# Patient Record
Sex: FEMALE | Race: WHITE
[De-identification: names, ages, dates, MRNs, and addresses within clinical notes are randomized per-mention and may not be internally consistent; named-entity substitution may affect disease eponyms.]

---

## 2017-04-05 ENCOUNTER — HOSPITAL ENCOUNTER (INPATIENT)
Dept: HOSPITAL 74 - JER | Age: 52
LOS: 4 days | Discharge: HOME | DRG: 247 | End: 2017-04-09
Attending: INTERNAL MEDICINE | Admitting: INTERNAL MEDICINE
Payer: COMMERCIAL

## 2017-04-05 VITALS — BODY MASS INDEX: 35.6 KG/M2

## 2017-04-05 DIAGNOSIS — K76.0: ICD-10-CM

## 2017-04-05 DIAGNOSIS — N20.0: ICD-10-CM

## 2017-04-05 DIAGNOSIS — K56.69: Primary | ICD-10-CM

## 2017-04-05 LAB
ALBUMIN SERPL-MCNC: 4.9 G/DL (ref 3.4–5)
ALP SERPL-CCNC: 90 U/L (ref 45–117)
ALT SERPL-CCNC: 59 U/L (ref 12–78)
AMYLASE SERPL-CCNC: 63 U/L (ref 25–115)
ANION GAP SERPL CALC-SCNC: 12 MMOL/L (ref 8–16)
APPEARANCE UR: CLEAR
AST SERPL-CCNC: 35 U/L (ref 15–37)
BASOPHILS # BLD: 0.4 % (ref 0–2)
BILIRUB SERPL-MCNC: 0.6 MG/DL (ref 0.2–1)
BILIRUB UR STRIP.AUTO-MCNC: NEGATIVE MG/DL
CALCIUM SERPL-MCNC: 10 MG/DL (ref 8.5–10.1)
CO2 SERPL-SCNC: 24 MMOL/L (ref 21–32)
COLOR UR: YELLOW
CREAT SERPL-MCNC: 1 MG/DL (ref 0.55–1.02)
DEPRECATED RDW RBC AUTO: 13.2 % (ref 11.6–15.6)
EOSINOPHIL # BLD: 0.4 % (ref 0–4.5)
GLUCOSE SERPL-MCNC: 124 MG/DL (ref 74–106)
HYALINE CASTS URNS QL MICRO: 1 /LPF
KETONES UR QL STRIP: (no result)
LEUKOCYTE ESTERASE UR QL STRIP.AUTO: NEGATIVE
MCH RBC QN AUTO: 29.3 PG (ref 25.7–33.7)
MCHC RBC AUTO-ENTMCNC: 33.7 G/DL (ref 32–36)
MCV RBC: 87.1 FL (ref 80–96)
NEUTROPHILS # BLD: 83.1 % (ref 42.8–82.8)
NITRITE UR QL STRIP: NEGATIVE
PH UR: 9 [PH] (ref 5–8)
PLATELET # BLD AUTO: 235 K/MM3 (ref 134–434)
PMV BLD: 8.5 FL (ref 7.5–11.1)
PROT SERPL-MCNC: 9.5 G/DL (ref 6.4–8.2)
PROT UR QL STRIP: (no result)
PROT UR QL STRIP: NEGATIVE
RBC # BLD AUTO: 8 /HPF (ref 0–3)
RBC # UR STRIP: NEGATIVE /UL
SP GR UR: 1.02 (ref 1–1.03)
UROBILINOGEN UR STRIP-MCNC: NEGATIVE E.U./DL (ref 0.2–1)
WBC # BLD AUTO: 10.1 K/MM3 (ref 4–10)
WBC # UR AUTO: 11 /HPF (ref 3–5)

## 2017-04-05 PROCEDURE — 0D9670Z DRAINAGE OF STOMACH WITH DRAINAGE DEVICE, VIA NATURAL OR ARTIFICIAL OPENING: ICD-10-PCS | Performed by: SURGERY

## 2017-04-05 RX ADMIN — SODIUM CHLORIDE SCH MLS/HR: 9 INJECTION, SOLUTION INTRAVENOUS at 16:15

## 2017-04-05 RX ADMIN — SODIUM CHLORIDE SCH MLS/HR: 9 INJECTION, SOLUTION INTRAVENOUS at 22:06

## 2017-04-05 NOTE — HP
CHIEF COMPLAINT: abd pain, nausea, vomiting





PCP: 





HISTORY OF PRESENT ILLNESS:


53 y/o F w/no PMH presents to ER w/ c/o abd pain and nausea since 3 am last 

night. Pt is Portuguese speaking and history was obtained via translation from 

sister-in-law at bedside. She states the pain started all of a sudden in her 

LLQ and moved to her whole abdomen. The pain was rated as a "12/10" and she 

felt nauseous as well.  Due to the nausea she induced herself to vomit. Vomit 

was nonbloody and nonbilious. She has never felt symptoms like this in the 

past. She has had 3 regular BMs since the onset of pain with no blood in stool. 

Last BM was approximately 1 hour before I had spoken to her and in the ER.  She 

also had 1 episode of emesis in ER that was also non-bloody, non-bilious.  Pt 

has had relief with pain meds and NGT placement in ER. She denies any CP, SOB, 

HA, fevers, chills, dysuria. No change in diet recently. Nephews and nieces are 

sick with gastric illnesses. Pt has had 1  and hysterectomy in the 

past.  She sees her PCP (Dr. Mohamud?) regularly.





ER course was notable for:


(1) benadryl, pepcid, reglan, zofran


(2)


(3)





Recent Travel: denies





PAST MEDICAL HISTORY: No PMH





PAST SURGICAL HISTORY:  24 years ago, hysterectomy 20 years ago





Social History:


Smoking: denies


Alcohol: denies


Drugs: denies





Family History: Brother: DM


Allergies





No Known Allergies Allergy (Verified 17 10:15)


 








HOME MEDICATIONS:


 Home Medications











 Medication  Instructions  Recorded


 


NK [No Known Home Medication]  17








REVIEW OF SYSTEMS


CONSTITUTIONAL: 


Absent:  fever, chills, diaphoresis, generalized weakness, malaise, loss of 

appetite, weight change


HEENT: 


Absent:  rhinorrhea, nasal congestion, throat pain, throat swelling, difficulty 

swallowing, mouth swelling, ear pain, eye pain, visual changes


CARDIOVASCULAR: 


Absent: chest pain, syncope, palpitations, irregular heart rate, lightheadedness

, peripheral edema


RESPIRATORY: 


Absent: cough, shortness of breath, dyspnea with exertion, orthopnea, wheezing, 

stridor, hemoptysis


GASTROINTESTINAL:


abd pain, nausea, vomiting


Absent:=abdominal distension, diarrhea, constipation, melena, hematochezia


GENITOURINARY: 


Absent: dysuria, frequency, urgency, hesitancy, hematuria, flank pain, genital 

pain


MUSCULOSKELETAL: 


Absent: myalgia, arthralgia, joint swelling, back pain, neck pain


SKIN: 


Absent: rash, itching, pallor


HEMATOLOGIC/IMMUNOLOGIC: 


Absent: easy bleeding, easy bruising, lymphadenopathy, frequent infections


ENDOCRINE:


Absent: unexplained weight gain, unexplained weight loss, heat intolerance, 

cold intolerance


NEUROLOGIC: 


Absent: headache, focal weakness or paresthesias, dizziness, unsteady gait, 

seizure, mental status changes, bladder or bowel incontinence


PSYCHIATRIC: 


Absent: anxiety, depression, suicidal or homicidal ideation, hallucinations.








PHYSICAL EXAMINATION


 Vital Signs - 24 hr











  17





  18:10 19:42


 


Temperature 98.8 F 98.1 F


 


Pulse Rate [ 102 H 104 H





Apical]  


 


Respiratory 19 19





Rate  


 


Blood Pressure 129/75 133/87





[Left Arm]  


 


O2 Sat by Pulse 96 97





Oximetry (%)  











GENERAL: Awake, alert, and fully oriented, in no acute distress.


HEAD: Normal with no signs of trauma.


EYES: extraocular movements intact, sclera anicteric, conjunctiva clear. No lid 

lag.


EARS, NOSE, THROAT: Ears normal, nares patent, oropharynx clear without 

exudates. Moist mucous membranes.


NECK: Normal range of motion, supple without lymphadenopathy, JVD, or masses.


LUNGS: Breath sounds equal, clear to auscultation bilaterally. No wheezes, and 

no crackles. No accessory muscle use.


HEART: Regular rate and rhythm, normal S1 and S2 without murmur, rub or gallop.


ABDOMEN: Soft, LLQ tenderness, not distended, hypoactive bowel sounds, no 

guarding, no rebound, no masses.  No hepatomegaly or  splenomegaly. NGT in 

place.


MUSCULOSKELETAL: Normal range of motion at all joints. No bony deformities or 

tenderness. No CVA tenderness.


LOWER EXTREMITIES: 2+ pulses, warm, well-perfused. No calf tenderness. No 

peripheral edema. 


NEUROLOGICAL:  Cranial nerves II-XII intact. Normal speech. Normal gait.


PSYCHIATRIC: Cooperative. Good eye contact. Appropriate mood and affect.


SKIN: Warm, dry, normal turgor, no rashes or lesions noted, normal capillary 

refill. 





 CBCD











WBC  10.1 K/mm3 (4.0-10.0)  H  17  12:30    


 


RBC  5.28 M/mm3 (3.60-5.2)  H  17  12:30    


 


Hgb  15.5 GM/dL (10.7-15.3)  H  17  12:30    


 


Hct  46.0 % (32.4-45.2)  H  17  12:30    


 


MCV  87.1 fl (80-96)   17  12:30    


 


MCHC  33.7 g/dl (32.0-36.0)   17  12:30    


 


RDW  13.2 % (11.6-15.6)   17  12:30    


 


Plt Count  235 K/MM3 (134-434)   17  12:30    


 


MPV  8.5 fl (7.5-11.1)   17  12:30    








 CMP











Sodium  138 mmol/L (136-145)   17  12:30    


 


Potassium  4.2 mmol/L (3.5-5.1)   17  12:30    


 


Chloride  102 mmol/L ()   17  12:30    


 


Carbon Dioxide  24 mmol/L (21-32)   17  12:30    


 


Anion Gap  12  (8-16)   17  12:30    


 


BUN  17 mg/dL (7-18)   17  12:30    


 


Creatinine  1.0 mg/dL (0.55-1.02)   17  12:30    


 


Creat Clearance w eGFR  57.78  (>60)   17  12:30    


 


Random Glucose  124 mg/dL ()  H  17  12:30    


 


Calcium  10.0 mg/dL (8.5-10.1)   17  12:30    


 


Total Bilirubin  0.6 mg/dL (0.2-1.0)   17  12:30    


 


AST  35 U/L (15-37)   17  12:30    


 


ALT  59 U/L (12-78)   17  12:30    


 


Alkaline Phosphatase  90 U/L ()   17  12:30    


 


Total Protein  9.5 g/dl (6.4-8.2)  H  17  12:30    


 


Albumin  4.9 g/dl (3.4-5.0)   17  12:30    








 Laboratory Tests











  17





  12:30


 


Total Amylase  63


 


Lipase  197








 Urine Test Results











Urine Color  Yellow   17  13:20    


 


Urine Appearance  Clear   17  13:20    


 


Urine pH  9.0  (5.0-8.0)  H  17  13:20    


 


Ur Specific Gravity  1.018  (1.001-1.035)   17  13:20    


 


Urine Protein  2+  (NEGATIVE)  H  17  13:20    


 


Urine Glucose (UA)  Negative  (NEGATIVE)   17  13:20    


 


Urine Ketones  1+  (NEGATIVE)  H  17  13:20    


 


Urine Blood  Negative  (NEGATIVE)   17  13:20    


 


Urine Nitrite  Negative  (NEGATIVE)   17  13:20    


 


Urine Bilirubin  Negative  (NEGATIVE)   17  13:20    


 


Ur Leukocyte Esterase  Negative  (NEGATIVE)   17  13:20    


 


Urine RBC  8 /hpf (0-3)   17  13:20    


 


Urine WBC  11 /hpf (3-5)   17  13:20    














Imaging:


Abd U/S: 17 - hepatomegaly with lipomatous infiltration otherwise normal 

RUQ ultrasound


CT abd/pelvis: 17 - mid ot distal small bowel obstruction - multiple fluid 

filled small bowel loops are seen w/in the abdomen bilaterally with a 

transition zone in the region of the upper pelvis centrally. Distal ileal bowel 

loops as well as the colon demonstrated a collapsed appearance. There is mild 

to moderate fluid-filled gastric distention.


B/L nephrolithiasis including a left sided staghorn calculus with resutant mild 

calyceal dilatation.


Prominent diffuse hepatic steatosis.





Active Medications





Acetaminophen (Ofirmev Injection -)  500 mg IVPB Q6H PRN


   PRN Reason: FEVER OR PAIN


   Stop: 17 16:45


Sodium Chloride (Normal Saline -)  1,000 mls @ 125 mls/hr IV ASDIR LISETTE


   Last Admin: 17 22:06 Dose:  125 mls/hr


Pantoprazole Sodium (Protonix 40mg Ivpb (Pre-Docked))  100 mls @ 200 mls/hr 

IVPB DAILY LISETTE


Ondansetron HCl (Zofran Injection)  4 mg IVPB Q6H PRN


   PRN Reason: NAUSEA











ASSESSMENT/PLAN:


53 y/o F w/no PMH presents to ER w/ c/o abd pain and nausea since 3 am last 

night. Found to have SBO and staghorn calculi.





-Abdominal pain secondary to SBO


   -NPO


   -NS @ 125 ml/hr


   -NGT in place w/low wall suction


   -Nausea: zofran 4mg iv q6h prn


   -Pain control: IV ofirmev 500 mg q6h PRN


   -Protonix 40 mg IV qd


   -Surgery consulted (Dr. Galdamez)


      -conservative management at this time


      -f/u Abd XR in AM





-Staghorn calculi


   -As seen on CT abd/pelvis


   -Urine pH 9.0


   -Serum calcium high normal at 10


   -Urology consulted (Dr. Cooper)





-DVT ppx


   -SCDs





-FEN


   -NS @ 125 ml/hr


   -Electrolytes wnl


   -NPO





-Dispo:


   -Admit to M/S





Problem List





- Problem


(1) DVT prophylaxis


Code(s): VCL3374 - 





(2) Nephrolithiasis


Code(s): N20.0 - CALCULUS OF KIDNEY





(3) Small bowel obstruction


Code(s): K56.69 - OTHER INTESTINAL OBSTRUCTION





(4) Staghorn calculus


Code(s): N20.0 - CALCULUS OF KIDNEY








Visit type





- Emergency Visit


Emergency Visit: Yes


ED Registration Date: 17


Care time: The patient presented to the Emergency Department on the above date 

and was hospitalized for further evaluation of their emergent condition.





- New Patient


This patient is new to me today: Yes


Date on this admission: 17





- Critical Care


Critical Care patient: No

## 2017-04-05 NOTE — PN
Progress Note (short form)





- Note


Progress Note: 


Surgery NOTE:





Asked to see the patient for an SBO seen on CT scan today without contrast. She 

has a history of c section and noted to have left midline tenderness since 3 

am. While at her house she had nausea which was relieved by emesis. Today she 

has had two bowel movement, non-bloody. She has no further abd pain but while 

in the ER she vomited alot which was witnessed by the ER staff. No dysuria, 

hematuria. The Er was unable to insert an NGT, so I assisted them.





 Vital Signs











 Period  Temp  Pulse  Resp  BP Sys/Ramos  Pulse Ox


 


 Last 24 Hr  98.0 F-98.8 F    19-20  124-148/75-92  96-99








PE:


GEN: alert, appears comfortable


CV: RRR


Lungs: CTA b/l


Abd: soft, non-distended, mild LLQ and to the left mid abd at umbilical level. 

No masses or rebound





CT scan: fluid filled gastric distention, fluild filled SB loops with tone of 

transition(mid abd)


US: hematomegaly


 CBC, BMP





 04/05/17 12:30 





 04/05/17 12:30 





NGT-16 fr inserted into left Nare and secured at 60cm. Air ausculated over the 

stomach with flushing of ngt, 300 ml out clear/fluid to light green returned. 

bgt placed to LWS





A/p: 51 yo female with SBO on ct scan, ngt inserted


   S/w Dr. Galdamez, full surgical consult to follow


   D/w Medical team, CXR to confirm placement


   admit to the medical service/npo and coservative managment with ngt 

decompression/iv hydration


   Gi ppx


   Axr ordered for the am

## 2017-04-05 NOTE — PN
<Regina Luo - Last Filed: 04/05/17 22:47>





Teaching Attending Note


Name of Resident: Jean Hinkle





Problem List





- Problems


(1) Small bowel obstruction


Assessment/Plan: 


NGT


NPO


IVF NS @125cc/h


Tylenol 500mg IVPB Q6h prn


Protonix 40mg IVPB


Zofran prn


Surgery consult











Code(s): K56.69 - OTHER INTESTINAL OBSTRUCTION





(2) Nephrolithiasis


Assessment/Plan: 


IVF


Urology consult


Code(s): N20.0 - CALCULUS OF KIDNEY





(3) DVT prophylaxis


Assessment/Plan: 


Heparin 5000U sq TID


Code(s): QQV1587 - 








<AndersonOksana - Last Filed: 04/06/17 00:26>





Teaching Attending Note


ATTENDING PHYSICIAN STATEMENT





I saw and evaluated the patient.


I reviewed the resident's note and discussed the case with the resident.


I agree with the resident's findings and plan as documented.








SUBJECTIVE:


51 yo F presents with abdominal pain, nausea and vomiting since 3am today. 

Patient states pain originated in her LLQ and radiates to her whole abdomen.  

Patient rates the pain as 12/10.  She notes that she has had 3 BM since last 

night. Denies sick contacts. Patient denies alleviating or worsening factors. 





PMHx: Denies





OBJECTIVE:


 Last Vital Signs











Temp Pulse Resp BP Pulse Ox


 


 97.7 F   100 H  20   131/85   98 


 


 04/05/17 22:28  04/05/17 22:28  04/05/17 22:28  04/05/17 22:28  04/05/17 22:39








GENERAL: Awake, alert, and fully oriented, in no acute distress


HEENT: NG tube. PERRLA, EOMI. Moist mucosa. No JVD


LUNGS: No distress, speaks full sentences, clear to auscultation bilaterally


HEART: Regular rate and rhythm, normal S1 and S2, no murmurs, rubs or gallops, 

peripheral


pulses normal and equal bilaterally.


ABDOMEN: Soft, LLQ tender on palpation, hypoactive bowel sounds. No guarding, 

no rebound. No


masses. Negative for CVA tenderness.


EXTREMITIES: Normal inspection, Normal range of motion, no edema. No clubbing or


cyanosis.


NEUROLOGICAL: Cranial nerves II through XII grossly intact. Normal speech, 

normal gait, no


focal sensorimotor deficits


SKIN: Warm, Dry, normal turgor, no rashes or lesions noted.


 CBCD











WBC  10.1 K/mm3 (4.0-10.0)  H  04/05/17  12:30    


 


RBC  5.28 M/mm3 (3.60-5.2)  H  04/05/17  12:30    


 


Hgb  15.5 GM/dL (10.7-15.3)  H  04/05/17  12:30    


 


Hct  46.0 % (32.4-45.2)  H  04/05/17  12:30    


 


MCV  87.1 fl (80-96)   04/05/17  12:30    


 


MCHC  33.7 g/dl (32.0-36.0)   04/05/17  12:30    


 


RDW  13.2 % (11.6-15.6)   04/05/17  12:30    


 


Plt Count  235 K/MM3 (134-434)   04/05/17  12:30    


 


MPV  8.5 fl (7.5-11.1)   04/05/17  12:30    








 CMP











Sodium  138 mmol/L (136-145)   04/05/17  12:30    


 


Potassium  4.2 mmol/L (3.5-5.1)   04/05/17  12:30    


 


Chloride  102 mmol/L ()   04/05/17  12:30    


 


Carbon Dioxide  24 mmol/L (21-32)   04/05/17  12:30    


 


Anion Gap  12  (8-16)   04/05/17  12:30    


 


BUN  17 mg/dL (7-18)   04/05/17  12:30    


 


Creatinine  1.0 mg/dL (0.55-1.02)   04/05/17  12:30    


 


Creat Clearance w eGFR  57.78  (>60)   04/05/17  12:30    


 


Calcium  10.0 mg/dL (8.5-10.1)   04/05/17  12:30    


 


Total Bilirubin  0.6 mg/dL (0.2-1.0)   04/05/17  12:30    


 


AST  35 U/L (15-37)   04/05/17  12:30    


 


ALT  59 U/L (12-78)   04/05/17  12:30    


 


Alkaline Phosphatase  90 U/L ()   04/05/17  12:30    


 


Total Protein  9.5 g/dl (6.4-8.2)  H  04/05/17  12:30    


 


Albumin  4.9 g/dl (3.4-5.0)   04/05/17  12:30    








Abdominal CT


Impression: 


A mid to distal small bowel obstruction is noted as discussed. 


Bilateral nephrolithiasis including a left-sided staghorn calculus with 

resultant mild calyceal 


dilatation. 


Prominent diffuse hepatic steatosis.





Documentation prepared by Oksana Barron, acting as medical scribe for Regina Luo M.D.

## 2017-04-05 NOTE — PDOC
History of Present Illness





- General


Chief Complaint: Pain


Stated Complaint: ABD PAIN


Time Seen by Provider: 04/05/17 12:28


History Source: Patient


Exam Limitations: No Limitations





- History of Present Illness


Travel History: No


Initial Comments: 





04/05/17 12:33


52 yr female with abd pain nausea and vomiting started at 3am today. no other 

family members are sick. Pt denies fever, chills no diarrhea. no abd surgery. 

no travel. 


Timing/Duration: reports: constant


Quality: reports: aching


Abdominal Pain Onset Location: reports: generalized abdomen


Pain Radiation: reports: epigastric


Activities at Onset: reports: eating





Past History





- Past Medical History


Allergies/Adverse Reactions: 


 Allergies











Allergy/AdvReac Type Severity Reaction Status Date / Time


 


No Known Allergies Allergy   Verified 04/05/17 10:15











Home Medications: 


Ambulatory Orders





NK [No Known Home Medication]  04/05/17 








Other medical history: DENIES





- Surgical History


Other Surgical History: 





04/05/17 12:34


denies





- Psycho/Social/Smoking Cessation Hx


Anxiety: No


Suicidal Ideation: No


Smoking History: Never smoked


Hx Alcohol Use: No


Drug/Substance Use Hx: No


Substance Use Type: None





Abd/GI Specific PMHX





- Complaint Specific PMHX


Colitis: No


Diverticulitis: No


Gall Bladder Disease: No


GERD: No


Hepatitis: No


Irritable Bowel Synd (IBS): No


Pancreatitis: No


GI Ulcer Disease: No





**Review of Systems





- Review of Systems


Able to Perform ROS?: Yes


Is the patient limited English proficient: No


Constitutional: No: Symptoms Reported


HEENTM: No: Symptoms Reported


Respiratory: No: Symptoms reported


Cardiac (ROS): No: Symptoms Reported


ABD/GI: Yes: Symptoms Reported, See HPI





*Physical Exam





- Vital Signs


 Last Vital Signs











Temp Pulse Resp BP Pulse Ox


 


 98.0 F   80   20   148/78   99 


 


 04/05/17 10:12  04/05/17 10:12  04/05/17 10:12  04/05/17 10:12  04/05/17 10:12














- Physical Exam


General Appearance: Yes: Nourished, Appropriately Dressed


HEENT: positive: EOMI, TITO, Normal ENT Inspection, TMs Normal, Pharynx Normal


Neck: positive: Supple.  negative: Tender


Respiratory/Chest: positive: Lungs Clear, Normal Breath Sounds.  negative: 

Chest Tender


Cardiovascular: positive: Regular Rhythm, Regular Rate


Gastrointestinal/Abdominal: positive: Normal Bowel Sounds, Tender, Increased 

Bowel Sounds.  negative: Guarding, Rebound


Musculoskeletal: positive: Normal Inspection


Extremity: positive: Normal Capillary Refill, Normal Inspection, Normal Range 

of Motion


Integumentary: positive: Normal Color, Dry, Warm


Neurologic: positive: Fully Oriented, Alert, Normal Mood/Affect, Normal Response

, Motor Strength 5/5





ED Treatment Course





- LABORATORY


CBC & Chemistry Diagram: 


 04/05/17 12:30





 04/05/17 12:30





Medical Decision Making





- Medical Decision Making


04/05/17 12:35


cc: abd pain, diarrhea vomiting started at 3am 


pain generalised radiates to epigastric area


no back pain or diarrhea. 





04/05/17 15:11


pt feels better after medications prescribed. 


US pending . 





04/05/17 17:35


ct done pt improved after the medications no vomiting





04/05/17 17:55


paged surgical PA ct results show distal SBO no answer





spoke to Kerrie Hospitalist NP to admit pt. accepted admission to 


will page  surgery





04/05/17 18:06


spoke to Kathi PRICE will see pt at bedside





04/05/17 18:14


Kathi Lama at bedside


family and pt aware of the plan explained via Latvian translation 





*DC/Admit/Observation/Transfer


Diagnosis at time of Disposition: 


 Small bowel obstruction





- Discharge Dispostion


Admit: Yes


Decision to Admit order Date/Time: 





04/05/17 18:05








- Referrals


Referrals: 


Becca Reyes MD [Primary Care Provider] -

## 2017-04-06 LAB
ALBUMIN SERPL-MCNC: 3.5 G/DL (ref 3.4–5)
ALP SERPL-CCNC: 65 U/L (ref 45–117)
ALT SERPL-CCNC: 40 U/L (ref 12–78)
ANION GAP SERPL CALC-SCNC: 7 MMOL/L (ref 8–16)
AST SERPL-CCNC: 23 U/L (ref 15–37)
BASOPHILS # BLD: 0.4 % (ref 0–2)
BILIRUB SERPL-MCNC: 0.6 MG/DL (ref 0.2–1)
CALCIUM SERPL-MCNC: 8.1 MG/DL (ref 8.5–10.1)
CO2 SERPL-SCNC: 26 MMOL/L (ref 21–32)
CREAT SERPL-MCNC: 0.8 MG/DL (ref 0.55–1.02)
DEPRECATED RDW RBC AUTO: 13.7 % (ref 11.6–15.6)
EOSINOPHIL # BLD: 1.7 % (ref 0–4.5)
GLUCOSE SERPL-MCNC: 101 MG/DL (ref 74–106)
MAGNESIUM SERPL-MCNC: 2.5 MG/DL (ref 1.8–2.4)
MCH RBC QN AUTO: 29.6 PG (ref 25.7–33.7)
MCHC RBC AUTO-ENTMCNC: 33.7 G/DL (ref 32–36)
MCV RBC: 87.8 FL (ref 80–96)
NEUTROPHILS # BLD: 64.6 % (ref 42.8–82.8)
PHOSPHATE SERPL-MCNC: 3 MG/DL (ref 2.5–4.9)
PLATELET # BLD AUTO: 196 K/MM3 (ref 134–434)
PMV BLD: 8.7 FL (ref 7.5–11.1)
PROT SERPL-MCNC: 7.1 G/DL (ref 6.4–8.2)
WBC # BLD AUTO: 8.8 K/MM3 (ref 4–10)

## 2017-04-06 RX ADMIN — PANTOPRAZOLE SODIUM SCH MLS/HR: 40 INJECTION, POWDER, FOR SOLUTION INTRAVENOUS at 09:16

## 2017-04-06 RX ADMIN — SODIUM CHLORIDE SCH MLS/HR: 9 INJECTION, SOLUTION INTRAVENOUS at 15:29

## 2017-04-06 RX ADMIN — SODIUM CHLORIDE SCH MLS/HR: 9 INJECTION, SOLUTION INTRAVENOUS at 06:16

## 2017-04-06 RX ADMIN — HEPARIN SODIUM SCH UNIT: 5000 INJECTION, SOLUTION INTRAVENOUS; SUBCUTANEOUS at 21:08

## 2017-04-06 NOTE — CON.GU
Consult





- History of Present Illness


History of Present Illness: 


53 yo female admitted with abdominal pain and small bowel obstruction. Noted to 

have bilateral nonobstructing nephrolithiasis with partial left staghorn 

calculus on CT. Pt is aware of her stones. Denies any flank pain





- Past Medical History


...Pregnant: No





- Alcohol/Substance Use


Hx Alcohol Use: No





- Smoking History


Smoking history: Never smoked


Have you smoked in the past 12 months: No





Home Medications





- Allergies


Allergies/Adverse Reactions: 


 Allergies











Allergy/AdvReac Type Severity Reaction Status Date / Time


 


No Known Allergies Allergy   Verified 04/05/17 10:15














- Home Medications


Home Medications: 


Ambulatory Orders





NK [No Known Home Medication]  04/05/17 











Physical Exam-


Vital Signs: 


 Vital Signs











Temperature  97.9 F   04/06/17 05:52


 


Pulse Rate  93 H  04/06/17 05:52


 


Respiratory Rate  20   04/06/17 05:52


 


Blood Pressure  119/66   04/06/17 05:52


 


O2 Sat by Pulse Oximetry (%)  98   04/05/17 22:39











Labs: 


 CBC, BMP





 04/06/17 06:30 





 04/06/17 06:30 











Imaging





- Results


Cat Scan: Report Reviewed





Problem List





- Problems


(1) Nephrolithiasis


Assessment/Plan: 


No indication for acute intervention for the nephrolithiasis. Recommend outpt 

followup for treatment of her stones


Code(s): N20.0 - CALCULUS OF KIDNEY

## 2017-04-06 NOTE — PN
Teaching Attending Note


Name of Resident: Ty Washington


ATTENDING PHYSICIAN STATEMENT





I saw and evaluated the patient.


I reviewed the resident's note and discussed the case with the resident.


I agree with the resident's findings and plan as documented.








SUBJECTIVE:states she is hungry and has a HA. abdominal pain has resolved. 

denies N/V/C/D, CP, SOB,fever or chills. no flatus/belching or BM








OBJECTIVE:


 Last Vital Signs











Temp Pulse Resp BP Pulse Ox


 


 98.9 F   66   20   133/82   98 


 


 04/06/17 13:43  04/06/17 13:43  04/06/17 13:43  04/06/17 13:43  04/06/17 09:00








General NAD


CV S1 S2 RRR no murmur/rub/gallop


Lungs CTA B/L no wheezing/rales/rhonchi


abdomen soft LLQ tenderness or deep palpation, non distended. no BS





ASSESSMENT AND PLAN:


51yo F with no PMH presented to the Er and was admitted for further evaluation 

of their emergent condition


1. SBO- clinically stable. requesting to eat. persistent SBO on AXR. serial 

abdominal exams. AXR tomorrow. monitor for return of bowel function. maintain 

NGT to suction. IVF, pain and nausea control. surgery on board


2. staghorn calculi- pt was told she had a kidney stone detected by US in 

December 2016 in  but did not follow up. urology evaluated will need to f/u 

as outpatient


3. DVT ppx- start hep sq

## 2017-04-06 NOTE — PN
Physical Exam: 


SUBJECTIVE: Patient seen and examined


Pt is awake, alert and fully oriented 


Pt has no s.s of acute distress 


No abdominal pain, no n/v 


last bowel movement was yesterday formed, bown


pt is passing gas 


Pt is ambulating 








OBJECTIVE:





 Vital Signs











 Period  Temp  Pulse  Resp  BP Sys/Ramos  Pulse Ox


 


 Last 24 Hr  97.7 F-98.9 F    18-20  119-135/66-87  96-98











GENERAL: The patient is awake, alert, and fully oriented, in no acute distress.


HEAD: Normal with no signs of trauma.


NECK: Trachea midline, full range of motion, supple. 


LUNGS: Breath sounds equal, clear to auscultation bilaterally, no wheezes, no 

crackles, no 


accessory muscle use. 


HEART: Regular rate and rhythm, S1, S2 without murmur, rub or gallop.


ABDOMEN: Soft, mild tenderness lower abdomen, nondistended, normoactive bowel 

sounds, no guarding, no 


rebound, no hepatosplenomegaly, no masses.


EXTREMITIES: 2+ pulses, warm, well-perfused, no edema. 


NEUROLOGICAL:. Normal speech, gait not observed.


PSYCH: Normal mood, normal affect.


SKIN: Warm, dry, normal turgor, no rashes or lesions noted














 Laboratory Results - last 24 hr











  04/06/17 04/06/17





  06:30 06:30


 


WBC  8.8 


 


RBC  4.41 


 


Hgb  13.1  D 


 


Hct  38.8  D 


 


MCV  87.8 


 


MCHC  33.7 


 


RDW  13.7 


 


Plt Count  196 


 


MPV  8.7 


 


Neutrophils %  64.6  D 


 


Lymphocytes %  26.4  D 


 


Monocytes %  6.9  D 


 


Eosinophils %  1.7  D 


 


Basophils %  0.4 


 


Sodium   144


 


Potassium   4.6


 


Chloride   111 H


 


Carbon Dioxide   26


 


Anion Gap   7 L


 


BUN   11  D


 


Creatinine   0.8


 


Creat Clearance w eGFR   > 60


 


Random Glucose   101


 


Calcium   8.1 L


 


Phosphorus   3.0


 


Magnesium   2.5 H


 


Total Bilirubin   0.6


 


AST   23  D


 


ALT   40  D


 


Alkaline Phosphatase   65  D


 


Total Protein   7.1  D


 


Albumin   3.5  D








Active Medications











Generic Name Dose Route Start Last Admin





  Trade Name Freq  PRN Reason Stop Dose Admin


 


Acetaminophen  500 mg 04/05/17 22:44  





  Ofirmev Injection -  IVPB 04/06/17 16:45  





  Q6H PRN   





  FEVER OR PAIN   


 


Sodium Chloride  1,000 mls @ 125 mls/hr 04/05/17 16:00 04/06/17 06:16





  Normal Saline -  IV   125 mls/hr





  ASDIR LISETTE   Administration


 


Pantoprazole Sodium  100 mls @ 200 mls/hr 04/06/17 10:00 04/06/17 09:16





  Protonix 40mg Ivpb (Pre-Docked)  IVPB   200 mls/hr





  DAILY LISETTE   Administration


 


Morphine Sulfate  2 mg 04/06/17 12:08 04/06/17 14:04





  Morphine Injection -  IVPUSH   2 mg





  Q4H PRN   Administration





  PAIN   


 


Ondansetron HCl  4 mg 04/05/17 22:41  





  Zofran Injection  IVPB   





  Q6H PRN   





  NAUSEA   








 CBC, BMP





 04/06/17 06:30 





 04/06/17 06:30 





 Laboratory Tests











  04/05/17





  13:20


 


Urine pH  9.0 H


 


Urine Protein  2+ H


 


Urine Ketones  1+ H


 


Urine Nitrite  Negative


 


Ur Leukocyte Esterase  Negative


 


Urine WBC  11


 


Urine HCG, Qual  Negative














ASSESSMENT/PLAN:


2 year old female with n significant pmh presents to the ED with complaint of 

nausea, abdominal pain. Pt was found to have small bowel obstruction and 

staghorn calculi 





SBO


US RUQ showed hepatomegaly with fatty infiltration 


CT abdomen  showed SOb with transition zone in central upper pelvis, b/l 

nephrolithiasis and left staghorn calucli  and hepatosteatosis 


conservative mgt 


NPO 


NGT at LIS


NS at 125ml/h 


Tylenol IV 1gm q6h PRN 


Morphine IV 2mg prn 


protonix 40mg IV qd


Zofran 4mg IV q6h prn 


intake and output 


Daily Abdominal XRay and serial exam.


Surgery consulted Dr Galdamez


 





Nepholithiasis with Staghorn calculi 


UA wbc 11, leuk est negative, Nitrite negative, PH 9, Ca dax 8.5


urology consulted Dr Martínez, no acute intervention, follow up as outpatient 





FEN 


Fluid: NS at 125ml/h


Electrolytes: no abnormalities 


Nutrition: NPO 





Disposition:  keep in medsurg pending resolution of SBO 














Visit type





- Emergency Visit


Emergency Visit: Yes


ED Registration Date: 04/05/17


Care time: The patient presented to the Emergency Department on the above date 

and was hospitalized for further evaluation of their emergent condition.





- New Patient


This patient is new to me today: Yes


Date on this admission: 04/06/17





- Critical Care


Critical Care patient: No





- Discharge Referral


Referred to [facility] No

## 2017-04-06 NOTE — EKG
Test Reason : 

Blood Pressure : ***/*** mmHG

Vent. Rate : 064 BPM     Atrial Rate : 064 BPM

   P-R Int : 166 ms          QRS Dur : 080 ms

    QT Int : 454 ms       P-R-T Axes : 048 028 041 degrees

   QTc Int : 468 ms

 

NORMAL SINUS RHYTHM

POSSIBLE LEFT ATRIAL ENLARGEMENT

CANNOT RULE OUT ANTERIOR INFARCT , AGE UNDETERMINED

ABNORMAL ECG

NO PREVIOUS ECGS AVAILABLE

Confirmed by ELISA DALTON MD ([id number]) on 4/6/2017 4:19:23 PM

 

Referred By:             Confirmed By:ELISA DALTON MD

## 2017-04-06 NOTE — CONSULT
- Consultation


REQUESTING PROVIDER: Emerson KLEIN





CONSULT REQUEST: We have been asked to surgically evaluate this patient for 

evaluation and management of abdominal pain.


PCP:Loreto Norman





HISTORY OF PRESENT ILLNESS:53 y/o  female presented w/ ~ # hours of n/v 

and abdominal pain; she had nausea and ? forced ? emesis; she was passing 

flatus and having bowel movements; w/u was done in the ER and was c/w SBO. She 

states she is passing flatus.





PMHx:    none      





PSHx:     C-S; ALEXIS   


 Home Medications











 Medication  Instructions  Recorded


 


NK [No Known Home Medication]  04/05/17








 Allergies











Allergy/AdvReac Type Severity Reaction Status Date / Time


 


No Known Allergies Allergy   Verified 04/05/17 10:15








PHYSICAL EXAM:


GENERAL: Awake, alert, and fully oriented, in no acute distress.


HEAD: Normal with no signs of trauma.


EYES: , sclera anicteric, 


NECK: Normal ROM, supple without lymphadenopathy, JVD, or masses.


ABDOMEN: Soft, nontender, not distended, no guarding, no rebound, no masses.  

No organomegaly. No hernias; healed lower midline surgical scar.


MUSCULOSKELETAL: Normal ROM at all joints. No bony deformities or tenderness. 

No CVA tenderness.


UPPER EXTREMITIES: 2+ pulses, warm, well-perfused. No cyanosis. Cap refill <2 

seconds. No peripheral edema.


LOWER EXTREMITIES: 2+ pulses, warm, well-perfused. No calf tenderness. No 

peripheral edema. 


NEUROLOGICAL: Normal speech, gait not observed.


PSYCH: Cooperative. Good eye contact. Appropriate mood and affect.


SKIN: Warm, dry, normal turgor, no rashes or lesions noted.


 Vital Signs











Temperature  97.9 F   04/06/17 05:52


 


Pulse Rate  93 H  04/06/17 05:52


 


Respiratory Rate  20   04/06/17 05:52


 


Blood Pressure  119/66   04/06/17 05:52


 


O2 Sat by Pulse Oximetry (%)  98   04/05/17 22:39








 Lab Results











WBC  8.8 K/mm3 (4.0-10.0)   04/06/17  06:30    


 


RBC  4.41 M/mm3 (3.60-5.2)   04/06/17  06:30    


 


Hgb  13.1 GM/dL (10.7-15.3)  D 04/06/17  06:30    


 


Hct  38.8 % (32.4-45.2)  D 04/06/17  06:30    


 


MCV  87.8 fl (80-96)   04/06/17  06:30    


 


MCHC  33.7 g/dl (32.0-36.0)   04/06/17  06:30    


 


RDW  13.7 % (11.6-15.6)   04/06/17  06:30    


 


Plt Count  196 K/MM3 (134-434)   04/06/17  06:30    


 


Sodium  144 mmol/L (136-145)   04/06/17  06:30    


 


Potassium  4.6 mmol/L (3.5-5.1)   04/06/17  06:30    


 


Chloride  111 mmol/L ()  H  04/06/17  06:30    


 


Carbon Dioxide  26 mmol/L (21-32)   04/06/17  06:30    


 


Anion Gap  7  (8-16)  L  04/06/17  06:30    


 


BUN  11 mg/dL (7-18)  D 04/06/17  06:30    


 


Creatinine  0.8 mg/dL (0.55-1.02)   04/06/17  06:30    


 


Random Glucose  101 mg/dL ()   04/06/17  06:30    


 


Calcium  8.1 mg/dL (8.5-10.1)  L  04/06/17  06:30    








Imaging to date reviewed





NGT drained 250 





IMP:SBO





PLAN:Follow up flat/upright axr's are ordered; continue NPO/IVF/NGT.








Farrukh Galdamez MD FACS





Visit type





- Case Type


Case Type: ED Admission





- Emergency


Emergency Visit: Yes


ED Registration Date: 04/05/17


Care time: The patient presented to the Emergency Department on the above date 

and was hospitalized for further evaluation of their emergent condition.





- New patient


This patient is new to me today: Yes


Date on this admission: 04/06/17





- Critical Care


Critical Care patient: No

## 2017-04-07 RX ADMIN — HEPARIN SODIUM SCH UNIT: 5000 INJECTION, SOLUTION INTRAVENOUS; SUBCUTANEOUS at 09:42

## 2017-04-07 RX ADMIN — SODIUM CHLORIDE SCH MLS/HR: 9 INJECTION, SOLUTION INTRAVENOUS at 01:43

## 2017-04-07 RX ADMIN — PANTOPRAZOLE SODIUM SCH MLS/HR: 40 INJECTION, POWDER, FOR SOLUTION INTRAVENOUS at 09:42

## 2017-04-07 RX ADMIN — SODIUM CHLORIDE SCH MLS/HR: 9 INJECTION, SOLUTION INTRAVENOUS at 18:31

## 2017-04-07 RX ADMIN — SODIUM CHLORIDE SCH MLS/HR: 9 INJECTION, SOLUTION INTRAVENOUS at 09:44

## 2017-04-07 RX ADMIN — HEPARIN SODIUM SCH UNIT: 5000 INJECTION, SOLUTION INTRAVENOUS; SUBCUTANEOUS at 21:58

## 2017-04-07 NOTE — PN
Progress Note (short form)





- Note


Progress Note: 


Attending Surgeon





No c/o; passing flatus; no pain





VSS AF





abdo-soft/flat;minimal tympany; no tenderness; o/w negative





NGT-400





AXR's; no a/f levels; air in rectum





IMP: Resolving SBO





PLAN: D/c ngt; keep NPO; f/u films in AM.





Farrukh Galdamez MD FACS

## 2017-04-07 NOTE — PN
Teaching Attending Note


Name of Resident: Ty Washington


ATTENDING PHYSICIAN STATEMENT





I saw and evaluated the patient.


I reviewed the resident's note and discussed the case with the resident.


I agree with the resident's findings and plan as documented.








SUBJECTIVE:clinically improved. pain has resolved. passing flatus. no BM no 

belching. denies CP, SOB,fever, chills, N/V/C/D








OBJECTIVE:


 Last Vital Signs











Temp Pulse Resp BP Pulse Ox


 


 98.1 F   67   20   132/74   98 


 


 04/07/17 10:00 04/07/17 10:00 04/07/17 10:00 04/07/17 10:00 04/06/17 21:00








General NAD


abdomen soft NT/ND. normal BS





ASSESSMENT AND PLAN:


51yo F with no PMH presented to the Er and was admitted for further evaluation 

of their emergent condition


1. SBO- clinically stable. NGT removed. bowel sounds heard. AXR not good study. 

will repeat in AM. will advance diet in AM if continues to clinically improve.  

serial abdominal exams. IVF, pain and nausea control. surgery on board


2. staghorn calculi- urology evaluated will need to f/u as outpatient


3. DVT ppx- hep sq

## 2017-04-08 RX ADMIN — SODIUM CHLORIDE SCH MLS/HR: 9 INJECTION, SOLUTION INTRAVENOUS at 03:46

## 2017-04-08 RX ADMIN — HEPARIN SODIUM SCH UNIT: 5000 INJECTION, SOLUTION INTRAVENOUS; SUBCUTANEOUS at 10:45

## 2017-04-08 RX ADMIN — PANTOPRAZOLE SODIUM SCH MLS/HR: 40 INJECTION, POWDER, FOR SOLUTION INTRAVENOUS at 10:45

## 2017-04-08 RX ADMIN — HEPARIN SODIUM SCH UNIT: 5000 INJECTION, SOLUTION INTRAVENOUS; SUBCUTANEOUS at 21:24

## 2017-04-08 NOTE — PN
Progress Note (short form)





- Note


Progress Note: 


pain resolved. passing copious amounts of flatus. has not eaten/drank today. 

denies CP, SOB,fever, chills, no BM





 Current Medications











Generic Name Dose Route Start Last Admin





  Trade Name Freq  PRN Reason Stop Dose Admin


 


Heparin Sodium (Porcine)  5,000 unit 04/06/17 22:00 04/08/17 10:45





  Heparin -  SQ   5,000 unit





  BID LISETTE   Administration


 


Sodium Chloride  1,000 mls @ 125 mls/hr 04/05/17 16:00 04/08/17 03:46





  Normal Saline -  IV   125 mls/hr





  ASDIR LISETTE   Administration


 


Pantoprazole Sodium  100 mls @ 200 mls/hr 04/06/17 10:00 04/08/17 10:45





  Protonix 40mg Ivpb (Pre-Docked)  IVPB   200 mls/hr





  DAILY LISETTE   Administration


 


Morphine Sulfate  2 mg 04/06/17 12:08 04/06/17 14:04





  Morphine Injection -  IVPUSH   2 mg





  Q4H PRN   Administration





  PAIN   


 


Ondansetron HCl  4 mg 04/05/17 22:41  





  Zofran Injection  IVPB   





  Q6H PRN   





  NAUSEA   








 Last Vital Signs











Temp Pulse Resp BP Pulse Ox


 


 98.1 F   66   17   132/69   99 


 


 04/08/17 13:49  04/08/17 13:49  04/08/17 13:49  04/08/17 04:00  04/07/17 21:00








General NAD


abdomen soft NT/ND. normal BS





ASSESSMENT AND PLAN:


53yo F with no PMH presented to the Er and was admitted for further evaluation 

of their emergent condition


1. SBO- clinically stable. AXR shows persistent SBO but clinically improved 

with flatus. trial of liquids by mouth. will slowly advance. serial abdominal 

exams. d/c IVF if tolerating diet. pain and nausea control. surgery on board


2. staghorn calculi- urology evaluated will need to f/u as outpatient


3. DVT ppx- hep sq





Visit type





- Emergency Visit


Emergency Visit: Yes


ED Registration Date: 04/05/17


Care time: The patient presented to the Emergency Department on the above date 

and was hospitalized for further evaluation of their emergent condition.





- New Patient


This patient is new to me today: No





- Critical Care


Critical Care patient: No





- Discharge Referral


Referred to [facility] No

## 2017-04-08 NOTE — PN
Progress Note (short form)





- Note


Progress Note: 


Attending Surgeon





No c/o; passing flatus; wants to drink/eat





VSS AF





abdomen-soft/flat/nontender; slight tympany





AXR-partial sbo;air in colon





IMP:resolving partial sbo





PLAN: Trial of clear liquids and advance as tolerated.

## 2017-04-09 VITALS — SYSTOLIC BLOOD PRESSURE: 135 MMHG | DIASTOLIC BLOOD PRESSURE: 84 MMHG

## 2017-04-09 VITALS — HEART RATE: 74 BPM | TEMPERATURE: 98.6 F

## 2017-04-09 RX ADMIN — SODIUM CHLORIDE SCH MLS/HR: 9 INJECTION, SOLUTION INTRAVENOUS at 02:00

## 2017-04-09 RX ADMIN — PANTOPRAZOLE SODIUM SCH MLS/HR: 40 INJECTION, POWDER, FOR SOLUTION INTRAVENOUS at 09:33

## 2017-04-09 RX ADMIN — SODIUM CHLORIDE SCH MLS/HR: 9 INJECTION, SOLUTION INTRAVENOUS at 09:32

## 2017-04-09 RX ADMIN — HEPARIN SODIUM SCH UNIT: 5000 INJECTION, SOLUTION INTRAVENOUS; SUBCUTANEOUS at 09:33

## 2017-04-09 NOTE — PN
Progress Note (short form)





- Note


Progress Note: 


pain resolved. BM last night and this morning. tolerated liquid last night and 

advanced to regular diet for lunch and tolerated well.  denies CP, SOB,fever, 

chills, 


 


 Current Medications











Generic Name Dose Route Start Last Admin





  Trade Name Freq  PRN Reason Stop Dose Admin


 


Heparin Sodium (Porcine)  5,000 unit 04/06/17 22:00 04/09/17 09:33





  Heparin -  SQ   5,000 unit





  BID LISETTE   Administration


 


Sodium Chloride  1,000 mls @ 125 mls/hr 04/05/17 16:00 04/09/17 09:32





  Normal Saline -  IV   125 mls/hr





  ASDIR LISETTE   Administration


 


Pantoprazole Sodium  100 mls @ 200 mls/hr 04/06/17 10:00 04/09/17 09:33





  Protonix 40mg Ivpb (Pre-Docked)  IVPB   200 mls/hr





  DAILY LISETTE   Administration


 


Morphine Sulfate  2 mg 04/06/17 12:08 04/06/17 14:04





  Morphine Injection -  IVPUSH   2 mg





  Q4H PRN   Administration





  PAIN   


 


Ondansetron HCl  4 mg 04/05/17 22:41  





  Zofran Injection  IVPB   





  Q6H PRN   





  NAUSEA   











 Last Vital Signs











Temp Pulse Resp BP Pulse Ox


 


 98.6 F   74   20   135/84   99 


 


 04/09/17 14:15  04/09/17 14:15  04/09/17 14:15  04/09/17 08:00  04/09/17 08:00











General NAD


abdomen soft NT/ND. normal BS





ASSESSMENT AND PLAN:


51yo F with no PMH presented to the Er and was admitted for further evaluation 

of their emergent condition


1. SBO- resolved. tolerating diet. 2 BM in the past 24H. diet advance to 

regular. surgery on board


2. staghorn calculi- urology evaluated will need to f/u as outpatient


3. DVT ppx- hep sq


4. d/c home





Visit type





- Emergency Visit


Emergency Visit: Yes


ED Registration Date: 04/05/17


Care time: The patient presented to the Emergency Department on the above date 

and was hospitalized for further evaluation of their emergent condition.





- New Patient


This patient is new to me today: No





- Critical Care


Critical Care patient: No





- Discharge Referral


Referred to [facility] No

## 2017-07-17 ENCOUNTER — HOSPITAL ENCOUNTER (OUTPATIENT)
Dept: HOSPITAL 74 - JASU-SURG | Age: 52
Discharge: HOME | End: 2017-07-17
Attending: UROLOGY
Payer: COMMERCIAL

## 2017-07-17 VITALS — TEMPERATURE: 98.1 F

## 2017-07-17 VITALS — DIASTOLIC BLOOD PRESSURE: 70 MMHG | SYSTOLIC BLOOD PRESSURE: 114 MMHG | HEART RATE: 82 BPM

## 2017-07-17 VITALS — BODY MASS INDEX: 30.5 KG/M2

## 2017-07-17 DIAGNOSIS — N20.0: Primary | ICD-10-CM

## 2017-07-17 PROCEDURE — 0TF4XZZ FRAGMENTATION IN LEFT KIDNEY PELVIS, EXTERNAL APPROACH: ICD-10-PCS | Performed by: UROLOGY

## 2017-07-17 NOTE — OP
Operative Note





- Note:


Operative Date: 07/17/17


Pre-Operative Diagnosis: left renal stones


Operation: left eswl


Findings: 


10 mm and 15 mm mid-pole stone with a 30mm x 10mm upper pole stone all on the 

left


Post-Operative Diagnosis: Same as Pre-op


Surgeon: Romario Edge

## 2017-07-18 NOTE — OP
DATE OF OPERATION:  07/17/2017

 

PREOPERATIVE DIAGNOSIS:  Left nephrolithiasis.

 

POSTOPERATIVE DIAGNOSIS:  Left nephrolithiasis.

 

PROCEDURE:  Left extracorporeal shock wave lithotripsy.

 

ATTENDING:  Ulises Garcia MD

 

ANESTHESIA:  General.

 

DESCRIPTION OF OPERATION:  The patient was brought in the operating room, placed in

supine position on the operating room table.  Ultrasonography and fluoroscopy were

performed.  Two large stones were noted in the mid-pole of the left kidney.  One

stone measured 10 mm, and the other stone measured 15 mm.  An additional upper pole

stone measuring 30 mm x 10 mm was also identified.  Extracorporeal shock wave

lithotripsy was going to be limited to the 2 mid-pole stones.  General anesthesia and

antibiotics were administered, and 1500 impulses at 20 joules of power were

administered to each of the 2 mid-pole stones.  Excellent fragmentation was noted

under real-time fluoroscopy and ultrasonography.  No complications were noted. 

Disposition of the patient was to recovery room.

 

 

ULISES GARCIA M.D. [id number]

DD: 07/17/2017 16:05

DT: 07/18/2017 14:40

Job #:  [id number]

## 2017-09-11 ENCOUNTER — HOSPITAL ENCOUNTER (OUTPATIENT)
Dept: HOSPITAL 74 - JASU-SURG | Age: 52
Discharge: HOME | End: 2017-09-11
Attending: UROLOGY
Payer: COMMERCIAL

## 2017-09-11 VITALS — HEART RATE: 60 BPM

## 2017-09-11 VITALS — TEMPERATURE: 98 F

## 2017-09-11 VITALS — DIASTOLIC BLOOD PRESSURE: 60 MMHG | SYSTOLIC BLOOD PRESSURE: 112 MMHG

## 2017-09-11 VITALS — BODY MASS INDEX: 30.5 KG/M2

## 2017-09-11 DIAGNOSIS — N20.0: Primary | ICD-10-CM

## 2017-09-11 PROCEDURE — 0TF3XZZ FRAGMENTATION IN RIGHT KIDNEY PELVIS, EXTERNAL APPROACH: ICD-10-PCS | Performed by: UROLOGY

## 2017-09-11 NOTE — OP
Operative Note





- Note:


Operative Date: 09/11/17


Pre-Operative Diagnosis: right renal stone


Operation: right eswl


Post-Operative Diagnosis: Same as Pre-op


Surgeon: Romario Edge


Anesthesia: Fractional

## 2017-09-11 NOTE — OP
DATE OF OPERATION:  09/11/2017

 

PREOPERATIVE DIAGNOSIS:  Right renal stone.

 

POSTOPERATIVE DIAGNOSIS:  Right renal stone.

 

PROCEDURE:  Right extracorporeal shock-wave lithotripsy.

 

ATTENDING:  Romario Huffman MD 

 

ANESTHESIA:  General.

 

DESCRIPTION OF PROCEDURE:  The patient was brought in the operating room and placed

in supine position on the operating room table.  Ultrasonography and fluoroscopy were

performed.  A 5-cm right lower pole stone was identified.  Anesthesia was then

administered.  Fractional anesthesia was utilized.  Levaquin was given preoperatively

for surgical prophylaxis.  Then 2500 impulses at 20 J of power were administered to

the stone with excellent fragmentation under real time ultrasonography and

fluoroscopy.  No complications were noted.  

 

DISPOSITION:  To the recovery room.

 

 

 

MANASA [id number]

DD: 09/11/2017 10:40

DT: 09/11/2017 11:07

Job #:  [id number]

## 2017-09-12 NOTE — OP
DATE OF OPERATION:  09/11/2017

 

PREOPERATIVE DIAGNOSIS:  Right renal stone.

 

POSTOPERATIVE DIAGNOSIS:  Right renal stone.

 

PROCEDURE:  Right extracorporeal shock wave lithotripsy.

 

ATTENDING:  Ulises Garcia MD

 

ANESTHESIA:  Fractional.

 

DESCRIPTION OF OPERATION:  The patient was brought in the operating room, placed in

supine position on the operating room table.  Ultrasonography and fluoroscopy were

performed.  A right lower pole, 5-mm stone was identified.  At this point, anesthesia

was started and Levaquin given.  Extracorporeal shock wave lithotripsy was then

performed; 2500 impulses at 20 joules of power were administered to the stone. 

Excellent fragmentation of the stone was noted under real-time ultrasonography and

fluoroscopy.  No complications were noted.

 

 

ULISES GARCIA M.D. [id number]

DD: 09/11/2017 16:46

DT: 09/12/2017 09:16

Job #:  [id number]

## 2018-02-26 ENCOUNTER — HOSPITAL ENCOUNTER (OUTPATIENT)
Dept: HOSPITAL 74 - JASU-SURG | Age: 53
Discharge: HOME | End: 2018-02-26
Attending: UROLOGY
Payer: COMMERCIAL

## 2018-02-26 VITALS — SYSTOLIC BLOOD PRESSURE: 139 MMHG | DIASTOLIC BLOOD PRESSURE: 87 MMHG | HEART RATE: 61 BPM

## 2018-02-26 VITALS — TEMPERATURE: 98.1 F

## 2018-02-26 VITALS — BODY MASS INDEX: 29.8 KG/M2

## 2018-02-26 DIAGNOSIS — N20.0: Primary | ICD-10-CM

## 2018-02-26 PROCEDURE — 0TF3XZZ FRAGMENTATION IN RIGHT KIDNEY PELVIS, EXTERNAL APPROACH: ICD-10-PCS | Performed by: UROLOGY

## 2018-02-26 NOTE — OP
Operative Note





- Note:


Operative Date: 02/26/18


Pre-Operative Diagnosis: Leftt kidney stone


Operation: Left ESWL


Findings: 





15 mm mid pole left kidney stone


Post-Operative Diagnosis: Same as Pre-op


Surgeon: Romario Edge


Anesthesia: Fractional

## 2018-02-27 NOTE — OP
DATE OF OPERATION:  02/26/2018

 

PREOPERATIVE DIAGNOSIS:  Left renal stone.  

 

POSTOPERATIVE DIAGNOSIS:  Left renal stone.  

 

PROCEDURE:  Left extracorporeal shock wave lithotripsy. 

 

ATTENDING:  Ulises Garcia MD

 

ANESTHESIA:  Fractional.

 

OPERATION:  The patient was brought into the operating room, placed in supine

position on the operating room table.  Ultrasonography and fluoroscopy were

performed.  A 15 mm x 20 mm left mid-pole stone was identified.  At this point,

anesthesia was administered as was preoperative antibiotics.  Then, 2500 impulses at

20 joules of power were administered to the stone.  Excellent fragmentation was

noted.  No complications were noted.  The disposition of the patient was to the

recovery room.  

 

 

ULISES GARCIA M.D. [id number]

DD: 02/26/2018 17:51

DT: 02/27/2018 08:59

Job #:  [id number]

## 2018-09-24 ENCOUNTER — HOSPITAL ENCOUNTER (OUTPATIENT)
Dept: HOSPITAL 74 - JASU-SURG | Age: 53
Discharge: HOME | End: 2018-09-24
Attending: UROLOGY
Payer: COMMERCIAL

## 2018-09-24 VITALS — BODY MASS INDEX: 29.8 KG/M2

## 2018-09-24 VITALS — HEART RATE: 71 BPM | SYSTOLIC BLOOD PRESSURE: 108 MMHG | DIASTOLIC BLOOD PRESSURE: 76 MMHG

## 2018-09-24 VITALS — TEMPERATURE: 98.5 F

## 2018-09-24 DIAGNOSIS — N20.0: Primary | ICD-10-CM

## 2018-09-24 PROCEDURE — 0TF4XZZ FRAGMENTATION IN LEFT KIDNEY PELVIS, EXTERNAL APPROACH: ICD-10-PCS | Performed by: UROLOGY

## 2018-09-24 NOTE — OP
DATE OF OPERATION:  09/24/2018

 

PREOPERATIVE DIAGNOSIS:  Left renal stone.

 

POSTOPERATIVE DIAGNOSIS:  Left renal stone.

 

PROCEDURE:  Left extracorporeal shock wave lithotripsy.

 

ATTENDING:  Ulises Garcia MD

 

ANESTHESIA:  General.

 

DESCRIPTION OF OPERATION:  The patient was brought in the operating room and placed

in a supine position on the operating room table.  Ultrasonography and fluoroscopy

were performed.  A 15-mm left upper pole kidney stone was identified.  Anesthesia was

then administered.  Next, 3000 impulses at 20 joules of power were administered to

the stone.  Excellent fragmentation as noted.  The patient tolerated the procedure

very well.  No complications were noted.  The disposition of the patient was to the

recovery room.

 

 

ULISES GARCIA M.D. [id number]

DD: 09/24/2018 15:45

DT: 09/24/2018 22:01

Job #:  [id number]

## 2018-09-24 NOTE — OP
Operative Note





- Note:


Operative Date: 09/24/18


Pre-Operative Diagnosis: Left renal stone


Operation: Left ESWL


Findings: 





15 mm Upper pole Left  kidney stone


Post-Operative Diagnosis: Same as Pre-op


Surgeon: Romario Edge


Anesthesia: Fractional


Estimated Blood Loss (mls): 0


Operative Report Dictated: Yes

## 2019-03-25 ENCOUNTER — HOSPITAL ENCOUNTER (OUTPATIENT)
Dept: HOSPITAL 74 - JASU-SURG | Age: 54
Discharge: HOME | End: 2019-03-25
Attending: UROLOGY
Payer: COMMERCIAL

## 2019-03-25 VITALS — TEMPERATURE: 98.7 F

## 2019-03-25 VITALS — SYSTOLIC BLOOD PRESSURE: 145 MMHG | HEART RATE: 53 BPM | DIASTOLIC BLOOD PRESSURE: 74 MMHG

## 2019-03-25 VITALS — BODY MASS INDEX: 29.8 KG/M2

## 2019-03-25 DIAGNOSIS — N20.0: Primary | ICD-10-CM

## 2019-03-25 PROCEDURE — 0TF4XZZ FRAGMENTATION IN LEFT KIDNEY PELVIS, EXTERNAL APPROACH: ICD-10-PCS | Performed by: UROLOGY

## 2019-03-25 NOTE — OP
Operative Note





- Note:


Operative Date: 03/25/19


Pre-Operative Diagnosis: Left renal stone


Operation: Left ESWL


Findings: 





15 mm upper pole Left renal stone


Post-Operative Diagnosis: Same as Pre-op


Surgeon: Romario Edge


Anesthesia: Fractional


Estimated Blood Loss (mls): 0


Operative Report Dictated: Yes

## 2019-05-01 NOTE — OP
DATE OF OPERATION:  03/25/2019

 

PREOPERATIVE DIAGNOSIS:  Left renal stone.

 

POSTOPERATIVE DIAGNOSIS:  Left renal stone.

 

PROCEDURE:  Left extracorporeal shock wave lithotripsy.

 

ATTENDING SURGEON:  Ulises Edge MD

 

ANESTHESIA:  Fractional.

 

OPERATION:  As follows, the patient was brought into the operating room and placed in

a supine position on the operating room table.  Ultrasonography and fluoroscopy were

performed.  A 15-mm left upper pole stone was noted.  Anesthesia and preoperative

antibiotics were then administered.  Shock wave lithotripsy was then performed

utilizing fluoroscopy and ultrasonography.  The stone was fragmentation.  No

complications were noted.  The patient tolerated the procedure very well.

 

 

ULISES GARCIA M.D.

 

[id number]

DD: 05/01/2019 08:33

DT: 05/01/2019 09:14

Job #:  [id number]

## 2019-11-06 NOTE — PN
Patient requests written preoperative instructions  Will mail " My Surgical Experience" booklet as well as written medication instructions  Physical Exam: 


SUBJECTIVE: Patient seen and examined


Pt is clinically better


Pt had some abdominal pain yesterday but none 


Pt passing gas 


NO n/v 


no fever or chills


400ml  out of NG tube yesterday 





OBJECTIVE:





 Vital Signs











 Period  Temp  Pulse  Resp  BP Sys/Ramos  Pulse Ox


 


 Last 24 Hr  98.1 F-98.4 F  63-70  19-20  109-132/68-74  98








GENERAL: The patient is awake, alert, and fully oriented, in no acute distress.


HEAD: Normal with no signs of trauma.


NECK: Trachea midline, full range of motion, supple. 


LUNGS: Breath sounds equal, clear to auscultation bilaterally, no wheezes, no 

crackles, no accessory muscle use. 


HEART: Regular rate and rhythm, S1, S2 without murmur, rub or gallop.


ABDOMEN: Soft, minimal tenderness, nondistended, normoactive bowel sounds, no 

guarding, no 


rebound, no hepatosplenomegaly, no masses.


EXTREMITIES: 2+ pulses, warm, well-perfused, no edema. 


NEUROLOGICAL:. Normal speech, gait not observed.


PSYCH: Normal mood, normal affect.


SKIN: Warm, dry, normal turgor, no rashes or lesions noted

















Active Medications











Generic Name Dose Route Start Last Admin





  Trade Name Freq  PRN Reason Stop Dose Admin


 


Heparin Sodium (Porcine)  5,000 unit 04/06/17 22:00 04/07/17 09:42





  Heparin -  SQ   5,000 unit





  BID LISETTE   Administration


 


Sodium Chloride  1,000 mls @ 125 mls/hr 04/05/17 16:00 04/07/17 09:44





  Normal Saline -  IV   125 mls/hr





  ASDIR LISETTE   Administration


 


Pantoprazole Sodium  100 mls @ 200 mls/hr 04/06/17 10:00 04/07/17 09:42





  Protonix 40mg Ivpb (Pre-Docked)  IVPB   200 mls/hr





  DAILY LISETTE   Administration


 


Morphine Sulfate  2 mg 04/06/17 12:08 04/06/17 14:04





  Morphine Injection -  IVPUSH   2 mg





  Q4H PRN   Administration





  PAIN   


 


Ondansetron HCl  4 mg 04/05/17 22:41  





  Zofran Injection  IVPB   





  Q6H PRN   





  NAUSEA   








 





 04/06/17 06:30 





 04/06/17 06:30 








ASSESSMENT/PLAN:


52 year old female with n significant pmh presents to the ED with complaint of 

nausea, abdominal pain. Pt was found to have small bowel obstruction and 

staghorn calculi 





SBO


US RUQ showed hepatomegaly with fatty infiltration 


CT abdomen  showed SOb with transition zone in central upper pelvis, b/l 

nephrolithiasis and left staghorn calucli  and hepatosteatosis 


conservative mgt 


Xray abdomen still shows SBO but pt is clinically improving 


Surgery Dr Galdamez on case 


Per Surgery Pt will remain NPO, NGT DC, reapeat abdominal Xray in am


NS at 125ml/h 


Tylenol IV 1gm q6h PRN 


Morphine IV 2mg prn 


protonix 40mg IV qd


Zofran 4mg IV q6h prn 


intake and output 





Nepholithiasis with Staghorn calculi 


UA wbc 11, leuk est negative, Nitrite negative, PH 9, Ca dax 8.5


urology consulted Dr Martínez, no acute intervention, follow up as outpatient 





FEN 


Fluid: NS at 125ml/h


Electrolytes: no abnormalities 


Nutrition: NPO 





Disposition:  keep in medsurg pending resolution of SBO 








Visit type





- Emergency Visit


Emergency Visit: Yes


ED Registration Date: 04/05/17


Care time: The patient presented to the Emergency Department on the above date 

and was hospitalized for further evaluation of their emergent condition.





- New Patient


This patient is new to me today: Yes





- Critical Care


Critical Care patient: No





- Discharge Referral


Referred to [facility] No

## 2020-02-24 ENCOUNTER — HOSPITAL ENCOUNTER (OUTPATIENT)
Dept: HOSPITAL 74 - JASU-SURG | Age: 55
Discharge: HOME | End: 2020-02-24
Attending: UROLOGY
Payer: COMMERCIAL

## 2020-02-24 VITALS — BODY MASS INDEX: 31.6 KG/M2

## 2020-02-24 VITALS — HEART RATE: 52 BPM | DIASTOLIC BLOOD PRESSURE: 74 MMHG | SYSTOLIC BLOOD PRESSURE: 116 MMHG

## 2020-02-24 VITALS — TEMPERATURE: 98 F

## 2020-02-24 DIAGNOSIS — N20.0: Primary | ICD-10-CM

## 2020-02-24 PROCEDURE — 0TF4XZZ FRAGMENTATION IN LEFT KIDNEY PELVIS, EXTERNAL APPROACH: ICD-10-PCS | Performed by: UROLOGY

## 2020-02-24 NOTE — OP
DATE OF OPERATION:  02/24/2020

 

PREOPERATIVE DIAGNOSIS:  Left renal stone.

 

POSTOPERATIVE DIAGNOSIS:  Left renal stone.

 

PROCEDURE:  Left extracorporeal shock wave lithotripsy.

 

ATTENDING:  Ulises Garcia MD

 

ANESTHESIA:  Fractional.

 

DESCRIPTION OF OPERATION:  Patient was brought in the operating room, placed in

supine position on the operating room table.  Ultrasonography and fluoroscopy were

performed.  A 20-mm left upper pole stone was noted under ultrasonography and

fluoroscopy.  Anesthesia and preoperative antibiotics were then administered.  Next,

2500 impulses at 20 joules of power were administered to the stone with moderate

breakage of the stone under real-time ultrasonography and fluoroscopy.  The patient

does not have a stent.  The patient has been warned that she may suffer from

postoperative obstruction secondary to a stone fragment.  The patient will increase

her fluids and report to me any change in her pain management status.  The

disposition of the patient was to the recovery room.  There were no complications

noted.

 

 

ULISES GARCIA M.D.

 

[id number]

DD: 02/24/2020 17:05

DT: 02/24/2020 19:45

Job #:  [id number]

## 2020-02-24 NOTE — OP
Operative Note





- Note:


Operative Date: 02/24/20


Pre-Operative Diagnosis: Left renal stone


Operation: Left ESWL


Findings: 





2 mm upper pole Left renal stone


Post-Operative Diagnosis: Same as Pre-op


Surgeon: Romario Edge


Anesthesia: Local, Fractional


Estimated Blood Loss (mls): 0


Drains, Volume Out (mls): 0


Operative Report Dictated: Yes

## 2021-04-05 ENCOUNTER — HOSPITAL ENCOUNTER (OUTPATIENT)
Dept: HOSPITAL 74 - JASU-SURG | Age: 56
Discharge: HOME | End: 2021-04-05
Attending: UROLOGY
Payer: COMMERCIAL

## 2021-04-05 VITALS — TEMPERATURE: 98.1 F | HEART RATE: 63 BPM | SYSTOLIC BLOOD PRESSURE: 128 MMHG | DIASTOLIC BLOOD PRESSURE: 78 MMHG

## 2021-04-05 VITALS — BODY MASS INDEX: 30.7 KG/M2

## 2021-04-05 DIAGNOSIS — N20.0: Primary | ICD-10-CM

## 2021-04-05 PROCEDURE — 0TF3XZZ FRAGMENTATION IN RIGHT KIDNEY PELVIS, EXTERNAL APPROACH: ICD-10-PCS | Performed by: UROLOGY

## 2022-10-18 ENCOUNTER — OFFICE (OUTPATIENT)
Dept: URBAN - METROPOLITAN AREA CLINIC 30 | Facility: CLINIC | Age: 57
Setting detail: OPHTHALMOLOGY
End: 2022-10-18
Payer: COMMERCIAL

## 2022-10-18 DIAGNOSIS — H52.4: ICD-10-CM

## 2022-10-18 DIAGNOSIS — H16.223: ICD-10-CM

## 2022-10-18 DIAGNOSIS — H40.033: ICD-10-CM

## 2022-10-18 DIAGNOSIS — H40.003: ICD-10-CM

## 2022-10-18 PROCEDURE — 68761 CLOSE TEAR DUCT OPENING: CPT | Performed by: OPHTHALMOLOGY

## 2022-10-18 PROCEDURE — 92015 DETERMINE REFRACTIVE STATE: CPT | Performed by: OPHTHALMOLOGY

## 2022-10-18 PROCEDURE — 92004 COMPRE OPH EXAM NEW PT 1/>: CPT | Performed by: OPHTHALMOLOGY

## 2022-10-18 PROCEDURE — 92020 GONIOSCOPY: CPT | Performed by: OPHTHALMOLOGY

## 2022-10-18 PROCEDURE — 92250 FUNDUS PHOTOGRAPHY W/I&R: CPT | Performed by: OPHTHALMOLOGY

## 2022-10-18 ASSESSMENT — REFRACTION_CURRENTRX
OD_ADD: +2.00
OS_CYLINDER: +0.75
OS_ADD: +2.00
OS_OVR_VA: 20/
OD_SPHERE: +1.75
OD_CYLINDER: +0.50
OD_OVR_VA: 20/
OD_AXIS: 172
OS_SPHERE: +1.75
OS_AXIS: 6

## 2022-10-18 ASSESSMENT — REFRACTION_MANIFEST
OS_SPHERE: +2.00
OS_AXIS: 10
OD_VA1: 20/40
OD_ADD: +2.50
OS_ADD: +2.50
OD_SPHERE: +1.75
OD_AXIS: 5
OS_CYLINDER: +0.50
OS_VA1: 20/25
OD_CYLINDER: +1.00

## 2022-10-18 ASSESSMENT — CONFRONTATIONAL VISUAL FIELD TEST (CVF)
OS_FINDINGS: FULL
OD_FINDINGS: FULL

## 2022-10-18 ASSESSMENT — REFRACTION_AUTOREFRACTION
OD_SPHERE: +1.50
OS_CYLINDER: +0.50
OD_CYLINDER: +1.25
OD_AXIS: 5
OS_AXIS: 10
OS_SPHERE: +2.00

## 2022-10-18 ASSESSMENT — SPHEQUIV_DERIVED
OS_SPHEQUIV: 2.25
OS_SPHEQUIV: 2.25
OD_SPHEQUIV: 2.25
OD_SPHEQUIV: 2.125

## 2022-10-18 ASSESSMENT — VISUAL ACUITY
OD_BCVA: 20/25-2
OS_BCVA: 20/40-2

## 2022-10-18 ASSESSMENT — TONOMETRY: OD_IOP_MMHG: 11

## 2022-10-24 ENCOUNTER — OFFICE (OUTPATIENT)
Dept: URBAN - METROPOLITAN AREA CLINIC 30 | Facility: CLINIC | Age: 57
Setting detail: OPHTHALMOLOGY
End: 2022-10-24
Payer: MEDICAID

## 2022-10-24 DIAGNOSIS — H40.033: ICD-10-CM

## 2022-10-24 PROCEDURE — 66761 REVISION OF IRIS: CPT | Performed by: OPHTHALMOLOGY

## 2022-10-24 ASSESSMENT — TONOMETRY
OD_IOP_MMHG: 17
OS_IOP_MMHG: 17

## 2022-10-24 ASSESSMENT — REFRACTION_CURRENTRX
OD_SPHERE: +1.75
OD_AXIS: 172
OS_AXIS: 6
OS_ADD: +2.00
OS_SPHERE: +1.75
OD_OVR_VA: 20/
OS_OVR_VA: 20/
OD_ADD: +2.00
OS_CYLINDER: +0.75
OD_CYLINDER: +0.50

## 2022-10-24 ASSESSMENT — VISUAL ACUITY
OS_BCVA: 20/40-2
OD_BCVA: 20/30-2

## 2022-10-24 ASSESSMENT — REFRACTION_MANIFEST
OD_ADD: +2.50
OD_AXIS: 5
OD_SPHERE: +1.75
OS_VA1: 20/25
OD_CYLINDER: +1.00
OD_VA1: 20/40
OS_ADD: +2.50
OS_SPHERE: +2.00
OS_CYLINDER: +0.50
OS_AXIS: 10

## 2022-10-24 ASSESSMENT — REFRACTION_AUTOREFRACTION
OD_SPHERE: +1.50
OD_CYLINDER: +1.25
OS_AXIS: 10
OS_CYLINDER: +0.50
OS_SPHERE: +2.00
OD_AXIS: 5

## 2022-10-24 ASSESSMENT — SPHEQUIV_DERIVED
OS_SPHEQUIV: 2.25
OD_SPHEQUIV: 2.25
OD_SPHEQUIV: 2.125
OS_SPHEQUIV: 2.25

## 2022-10-24 ASSESSMENT — CONFRONTATIONAL VISUAL FIELD TEST (CVF)
OD_FINDINGS: FULL
OS_FINDINGS: FULL

## 2022-11-14 ENCOUNTER — OFFICE (OUTPATIENT)
Dept: URBAN - METROPOLITAN AREA CLINIC 30 | Facility: CLINIC | Age: 57
Setting detail: OPHTHALMOLOGY
End: 2022-11-14
Payer: MEDICAID

## 2022-11-14 ENCOUNTER — RX ONLY (RX ONLY)
Age: 57
End: 2022-11-14

## 2022-11-14 DIAGNOSIS — H40.033: ICD-10-CM

## 2022-11-14 DIAGNOSIS — H16.223: ICD-10-CM

## 2022-11-14 DIAGNOSIS — H40.003: ICD-10-CM

## 2022-11-14 PROCEDURE — 92012 INTRM OPH EXAM EST PATIENT: CPT | Performed by: OPHTHALMOLOGY

## 2022-11-14 PROCEDURE — 92020 GONIOSCOPY: CPT | Performed by: OPHTHALMOLOGY

## 2022-11-14 ASSESSMENT — REFRACTION_AUTOREFRACTION
OD_CYLINDER: +1.25
OD_SPHERE: +1.50
OD_AXIS: 5
OS_CYLINDER: +0.50
OS_AXIS: 10
OS_SPHERE: +2.00

## 2022-11-14 ASSESSMENT — REFRACTION_MANIFEST
OD_AXIS: 5
OS_SPHERE: +2.00
OD_SPHERE: +1.75
OD_ADD: +2.50
OS_ADD: +2.50
OS_VA1: 20/25
OD_CYLINDER: +1.00
OD_VA1: 20/40
OS_CYLINDER: +0.50
OS_AXIS: 10

## 2022-11-14 ASSESSMENT — TONOMETRY
OD_IOP_MMHG: 15
OS_IOP_MMHG: 15

## 2022-11-14 ASSESSMENT — SPHEQUIV_DERIVED
OD_SPHEQUIV: 2.25
OS_SPHEQUIV: 2.25
OS_SPHEQUIV: 2.25
OD_SPHEQUIV: 2.125

## 2022-11-14 ASSESSMENT — REFRACTION_CURRENTRX
OS_VPRISM_DIRECTION: PROGS
OS_OVR_VA: 20/
OS_SPHERE: +1.75
OD_CYLINDER: +0.50
OS_CYLINDER: +0.75
OD_ADD: +2.25
OS_ADD: +2.25
OD_SPHERE: +2.00
OD_AXIS: 180
OS_AXIS: 10
OD_OVR_VA: 20/
OD_VPRISM_DIRECTION: PROGS

## 2022-11-14 ASSESSMENT — CONFRONTATIONAL VISUAL FIELD TEST (CVF)
OS_FINDINGS: FULL
OD_FINDINGS: FULL

## 2022-11-14 ASSESSMENT — VISUAL ACUITY
OD_BCVA: 20/70-2
OS_BCVA: 20/30-2

## 2023-03-28 ENCOUNTER — RX ONLY (RX ONLY)
Age: 58
End: 2023-03-28

## 2023-03-28 ENCOUNTER — OFFICE (OUTPATIENT)
Dept: URBAN - METROPOLITAN AREA CLINIC 30 | Facility: CLINIC | Age: 58
Setting detail: OPHTHALMOLOGY
End: 2023-03-28
Payer: MEDICAID

## 2023-03-28 DIAGNOSIS — S05.02XA: ICD-10-CM

## 2023-03-28 DIAGNOSIS — H40.033: ICD-10-CM

## 2023-03-28 DIAGNOSIS — T15.12XA: ICD-10-CM

## 2023-03-28 DIAGNOSIS — H16.223: ICD-10-CM

## 2023-03-28 DIAGNOSIS — H40.003: ICD-10-CM

## 2023-03-28 PROCEDURE — 65210 REMOVE FOREIGN BODY FROM EYE: CPT | Performed by: OPHTHALMOLOGY

## 2023-03-28 PROCEDURE — 92012 INTRM OPH EXAM EST PATIENT: CPT | Performed by: OPHTHALMOLOGY

## 2023-03-28 RX ORDER — TOBRAMYCIN AND DEXAMETHASONE 3; 1 MG/ML; MG/ML
SUSPENSION/ DROPS OPHTHALMIC
Qty: 5 | Refills: 3 | Status: ACTIVE | OUTPATIENT

## 2023-03-28 ASSESSMENT — REFRACTION_AUTOREFRACTION
OD_SPHERE: +1.50
OD_AXIS: 5
OS_CYLINDER: +0.50
OD_CYLINDER: +1.25
OS_AXIS: 10
OS_SPHERE: +2.00

## 2023-03-28 ASSESSMENT — SPHEQUIV_DERIVED
OD_SPHEQUIV: 2.25
OS_SPHEQUIV: 2.25
OS_SPHEQUIV: 2.25
OD_SPHEQUIV: 2.125

## 2023-03-28 ASSESSMENT — REFRACTION_MANIFEST
OD_ADD: +2.50
OD_AXIS: 5
OS_AXIS: 10
OD_CYLINDER: +1.00
OS_CYLINDER: +0.50
OS_SPHERE: +2.00
OD_VA1: 20/40
OS_VA1: 20/25
OS_ADD: +2.50
OD_SPHERE: +1.75

## 2023-03-28 ASSESSMENT — REFRACTION_CURRENTRX
OD_AXIS: 180
OS_VPRISM_DIRECTION: PROGS
OS_AXIS: 10
OD_CYLINDER: +0.50
OD_SPHERE: +2.00
OD_VPRISM_DIRECTION: PROGS
OS_ADD: +2.25
OS_OVR_VA: 20/
OS_CYLINDER: +0.75
OD_ADD: +2.25
OD_OVR_VA: 20/
OS_SPHERE: +1.75

## 2023-03-28 ASSESSMENT — CONFRONTATIONAL VISUAL FIELD TEST (CVF)
OD_FINDINGS: FULL
OS_FINDINGS: FULL

## 2023-03-28 ASSESSMENT — VISUAL ACUITY
OD_BCVA: 20/60-2
OS_BCVA: 20/30-2

## 2023-04-10 ENCOUNTER — OFFICE (OUTPATIENT)
Dept: URBAN - METROPOLITAN AREA CLINIC 30 | Facility: CLINIC | Age: 58
Setting detail: OPHTHALMOLOGY
End: 2023-04-10
Payer: MEDICAID

## 2023-04-10 DIAGNOSIS — H40.033: ICD-10-CM

## 2023-04-10 DIAGNOSIS — H16.223: ICD-10-CM

## 2023-04-10 DIAGNOSIS — H40.003: ICD-10-CM

## 2023-04-10 PROCEDURE — 92020 GONIOSCOPY: CPT | Performed by: OPHTHALMOLOGY

## 2023-04-10 PROCEDURE — 92014 COMPRE OPH EXAM EST PT 1/>: CPT | Performed by: OPHTHALMOLOGY

## 2023-04-10 ASSESSMENT — TONOMETRY
OS_IOP_MMHG: 16
OD_IOP_MMHG: 16

## 2023-04-10 ASSESSMENT — REFRACTION_MANIFEST
OS_SPHERE: +2.00
OD_CYLINDER: +1.00
OS_VA1: 20/25
OS_ADD: +2.50
OD_VA1: 20/40
OS_AXIS: 10
OD_SPHERE: +1.75
OD_AXIS: 5
OD_ADD: +2.50
OS_CYLINDER: +0.50

## 2023-04-10 ASSESSMENT — REFRACTION_CURRENTRX
OS_OVR_VA: 20/
OS_VPRISM_DIRECTION: PROGS
OS_ADD: +2.25
OS_AXIS: 10
OD_VPRISM_DIRECTION: PROGS
OS_SPHERE: +1.75
OD_CYLINDER: +0.50
OD_ADD: +2.25
OD_AXIS: 180
OS_CYLINDER: +0.75
OD_SPHERE: +2.00
OD_OVR_VA: 20/

## 2023-04-10 ASSESSMENT — VISUAL ACUITY
OS_BCVA: 20/30-2
OD_BCVA: 20/60-2

## 2023-04-10 ASSESSMENT — CONFRONTATIONAL VISUAL FIELD TEST (CVF)
OD_FINDINGS: FULL
OS_FINDINGS: FULL

## 2023-04-10 ASSESSMENT — REFRACTION_AUTOREFRACTION
OS_CYLINDER: +0.50
OD_SPHERE: +1.50
OS_AXIS: 10
OD_AXIS: 5
OD_CYLINDER: +1.25
OS_SPHERE: +2.00

## 2023-04-10 ASSESSMENT — SPHEQUIV_DERIVED
OS_SPHEQUIV: 2.25
OS_SPHEQUIV: 2.25
OD_SPHEQUIV: 2.125
OD_SPHEQUIV: 2.25

## 2023-10-02 ENCOUNTER — OFFICE (OUTPATIENT)
Dept: URBAN - METROPOLITAN AREA CLINIC 30 | Facility: CLINIC | Age: 58
Setting detail: OPHTHALMOLOGY
End: 2023-10-02

## 2023-10-02 DIAGNOSIS — H16.223: ICD-10-CM

## 2023-10-02 DIAGNOSIS — H40.033: ICD-10-CM

## 2023-10-02 DIAGNOSIS — H04.203: ICD-10-CM

## 2023-10-02 DIAGNOSIS — H52.4: ICD-10-CM

## 2023-10-02 DIAGNOSIS — H40.003: ICD-10-CM

## 2023-10-02 PROCEDURE — 68761 CLOSE TEAR DUCT OPENING: CPT | Mod: 50 | Performed by: OPHTHALMOLOGY

## 2023-10-02 PROCEDURE — 92015 DETERMINE REFRACTIVE STATE: CPT | Performed by: OPHTHALMOLOGY

## 2023-10-02 PROCEDURE — 92020 GONIOSCOPY: CPT | Performed by: OPHTHALMOLOGY

## 2023-10-02 PROCEDURE — 92012 INTRM OPH EXAM EST PATIENT: CPT | Mod: 25 | Performed by: OPHTHALMOLOGY

## 2023-10-02 PROCEDURE — 68840 EXPLORE/IRRIGATE TEAR DUCTS: CPT | Mod: 50 | Performed by: OPHTHALMOLOGY

## 2023-10-02 ASSESSMENT — TONOMETRY
OD_IOP_MMHG: 18
OS_IOP_MMHG: 18

## 2023-10-02 ASSESSMENT — REFRACTION_AUTOREFRACTION
OD_AXIS: 5
OD_CYLINDER: +1.25
OS_CYLINDER: +0.50
OS_SPHERE: +2.00
OS_AXIS: 10
OD_SPHERE: +1.50

## 2023-10-02 ASSESSMENT — VISUAL ACUITY
OD_BCVA: 20/60-2
OS_BCVA: 20/20

## 2023-10-02 ASSESSMENT — REFRACTION_CURRENTRX
OS_SPHERE: +1.75
OS_ADD: +2.25
OD_OVR_VA: 20/
OD_AXIS: 180
OS_VPRISM_DIRECTION: PROGS
OD_CYLINDER: +0.50
OS_AXIS: 10
OS_OVR_VA: 20/
OD_ADD: +2.25
OS_CYLINDER: +0.75
OD_VPRISM_DIRECTION: PROGS
OD_SPHERE: +2.00

## 2023-10-02 ASSESSMENT — REFRACTION_MANIFEST
OD_AXIS: 5
OS_ADD: +2.50
OS_CYLINDER: +0.50
OD_CYLINDER: +1.00
OD_VA1: 20/20
OD_SPHERE: +1.75
OD_ADD: +2.50
OS_AXIS: 10
OS_SPHERE: +2.00
OS_VA1: 20/25

## 2023-10-02 ASSESSMENT — CONFRONTATIONAL VISUAL FIELD TEST (CVF)
OS_FINDINGS: FULL
OD_FINDINGS: FULL

## 2023-10-02 ASSESSMENT — SPHEQUIV_DERIVED
OD_SPHEQUIV: 2.25
OS_SPHEQUIV: 2.25
OS_SPHEQUIV: 2.25
OD_SPHEQUIV: 2.125

## 2024-08-11 ENCOUNTER — HOSPITAL ENCOUNTER (EMERGENCY)
Dept: HOSPITAL 74 - JERFT | Age: 59
Discharge: HOME | End: 2024-08-11
Payer: COMMERCIAL

## 2024-08-11 VITALS
RESPIRATION RATE: 18 BRPM | DIASTOLIC BLOOD PRESSURE: 82 MMHG | SYSTOLIC BLOOD PRESSURE: 161 MMHG | HEART RATE: 77 BPM | TEMPERATURE: 98.7 F

## 2024-08-11 VITALS — BODY MASS INDEX: 32.3 KG/M2

## 2024-08-11 DIAGNOSIS — L03.213: ICD-10-CM

## 2024-08-11 DIAGNOSIS — H57.11: Primary | ICD-10-CM

## 2024-08-11 RX ADMIN — TETRACAINE HYDROCHLORIDE ONE DROP: 25 LIQUID OPHTHALMIC at 12:33

## 2024-08-12 ENCOUNTER — OFFICE (OUTPATIENT)
Dept: URBAN - METROPOLITAN AREA CLINIC 30 | Facility: CLINIC | Age: 59
Setting detail: OPHTHALMOLOGY
End: 2024-08-12

## 2024-08-12 ENCOUNTER — RX ONLY (RX ONLY)
Age: 59
End: 2024-08-12

## 2024-08-12 DIAGNOSIS — H40.003: ICD-10-CM

## 2024-08-12 DIAGNOSIS — H15.101: ICD-10-CM

## 2024-08-12 DIAGNOSIS — H04.203: ICD-10-CM

## 2024-08-12 DIAGNOSIS — H40.033: ICD-10-CM

## 2024-08-12 DIAGNOSIS — H16.223: ICD-10-CM

## 2024-08-12 PROCEDURE — 99213 OFFICE O/P EST LOW 20 MIN: CPT | Performed by: OPHTHALMOLOGY

## 2024-08-12 ASSESSMENT — CONFRONTATIONAL VISUAL FIELD TEST (CVF)
OD_FINDINGS: FULL
OS_FINDINGS: FULL